# Patient Record
Sex: MALE
[De-identification: names, ages, dates, MRNs, and addresses within clinical notes are randomized per-mention and may not be internally consistent; named-entity substitution may affect disease eponyms.]

---

## 2023-08-16 ENCOUNTER — APPOINTMENT (OUTPATIENT)
Dept: PULMONOLOGY | Facility: CLINIC | Age: 43
End: 2023-08-16
Payer: COMMERCIAL

## 2023-08-16 VITALS
HEIGHT: 65 IN | WEIGHT: 155 LBS | DIASTOLIC BLOOD PRESSURE: 70 MMHG | BODY MASS INDEX: 25.83 KG/M2 | SYSTOLIC BLOOD PRESSURE: 100 MMHG | OXYGEN SATURATION: 98 % | HEART RATE: 65 BPM

## 2023-08-16 DIAGNOSIS — J20.9 ACUTE BRONCHITIS, UNSPECIFIED: ICD-10-CM

## 2023-08-16 DIAGNOSIS — Z78.9 OTHER SPECIFIED HEALTH STATUS: ICD-10-CM

## 2023-08-16 DIAGNOSIS — F19.90 OTHER PSYCHOACTIVE SUBSTANCE USE, UNSPECIFIED, UNCOMPLICATED: ICD-10-CM

## 2023-08-16 PROBLEM — Z00.00 ENCOUNTER FOR PREVENTIVE HEALTH EXAMINATION: Status: ACTIVE | Noted: 2023-08-16

## 2023-08-16 PROCEDURE — 99203 OFFICE O/P NEW LOW 30 MIN: CPT

## 2023-08-16 NOTE — HISTORY OF PRESENT ILLNESS
[TextBox_4] : 43-year-old non-smoker who developed a sore throat on July 23.  This was followed about 3 days later with severe dry cough.  He went to urgent care on August 1 and was treated with a Z-Sukumar, cough suppressant and prednisone for 5 days.  He still had a lingering cough and went to urgent care and I believe August 11.  A chest x-ray was done at that time which reveals a area of subsegmental atelectasis in the right perihilar region.  Patient is not wheezing and no shortness of breath.  The cough is much better although it still is present at times.  He denies any fever or chills.  Past medical history none.  Meds none.  There is no history of underlying lung disease.  He does state that when he catches a cold the cough tends to linger for a few weeks.

## 2023-08-16 NOTE — ASSESSMENT
[FreeTextEntry1] : Patient with a viral bronchitis.  The chest x-ray was reviewed by me reveals an area of subsegmental atelectasis in the right perihilar region.  I feel this is of no clinical significance.  Patient has been reassured.  I feel the cough will resolve over the next couple of weeks.  I have asked the patient to call me in 2 weeks for follow-up.